# Patient Record
Sex: MALE | Race: WHITE | Employment: UNEMPLOYED | URBAN - NONMETROPOLITAN AREA
[De-identification: names, ages, dates, MRNs, and addresses within clinical notes are randomized per-mention and may not be internally consistent; named-entity substitution may affect disease eponyms.]

---
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
## 2025-04-14 ENCOUNTER — OFFICE VISIT (OUTPATIENT)
Dept: ENT CLINIC | Age: 20
End: 2025-04-14
Payer: COMMERCIAL

## 2025-04-14 VITALS
HEIGHT: 71 IN | BODY MASS INDEX: 25.34 KG/M2 | DIASTOLIC BLOOD PRESSURE: 80 MMHG | WEIGHT: 181 LBS | SYSTOLIC BLOOD PRESSURE: 112 MMHG

## 2025-04-14 DIAGNOSIS — H61.23 BILATERAL IMPACTED CERUMEN: Primary | ICD-10-CM

## 2025-04-14 PROCEDURE — G8419 CALC BMI OUT NRM PARAM NOF/U: HCPCS | Performed by: NURSE PRACTITIONER

## 2025-04-14 PROCEDURE — 69210 REMOVE IMPACTED EAR WAX UNI: CPT | Performed by: NURSE PRACTITIONER

## 2025-04-14 PROCEDURE — 99213 OFFICE O/P EST LOW 20 MIN: CPT | Performed by: NURSE PRACTITIONER

## 2025-04-14 PROCEDURE — 1036F TOBACCO NON-USER: CPT | Performed by: NURSE PRACTITIONER

## 2025-04-14 PROCEDURE — G8427 DOCREV CUR MEDS BY ELIG CLIN: HCPCS | Performed by: NURSE PRACTITIONER

## 2025-04-14 ASSESSMENT — ENCOUNTER SYMPTOMS
ALLERGIC/IMMUNOLOGIC NEGATIVE: 1
RESPIRATORY NEGATIVE: 1
GASTROINTESTINAL NEGATIVE: 1
EYES NEGATIVE: 1

## 2025-04-14 NOTE — PROGRESS NOTES
Effort: Pulmonary effort is normal.   Musculoskeletal:      Cervical back: Normal range of motion.   Skin:     General: Skin is warm and dry.   Neurological:      General: No focal deficit present.      Mental Status: He is alert and oriented to person, place, and time.   Psychiatric:         Mood and Affect: Mood normal.         Behavior: Behavior normal.          Procedure Note:    Use of Operating Microscope and Cerumen Removal CPT code 65400-  Indications: Bilateral cerumen impaction obstructing visualization of the tympanic membrane(s).      An operating microscope was utilized to visualize the external auditory canals using a speculum. The external auditory canals were occluded with cerumen bilateral. The cerumen and debris was removed with instrumentation including suction and currettes under microscopic evaluation. The left tympanic membrane and ossicles are intact. No fluid was visualized in the left middle ear. There is some cerumen remaining in the right canal that could not be removed due to patient discomfort and safety. The right tympanic membrane was partially visualized.            ASSESSMENT/PLAN:    1. Bilateral impacted cerumen  -     carbamide peroxide (DEBROX) 6.5 % otic solution; Place 5 drops into the right ear 2 times daily for 7 days, Disp-15 mL, R-0Normal    Start Debrox drops in right ear. Follow-up in about 1 week for cerumen check, sooner if needed.    Return in about 1 week (around 4/21/2025).    An electronic signature was used to authenticate this note.    CARMEN Aguilar - CNP       Please note that this chart was generated using dragon dictation software.  Although every effort was made to ensure the accuracy of this automated transcription, some errors in transcription may have occurred.

## 2025-04-22 ENCOUNTER — OFFICE VISIT (OUTPATIENT)
Dept: ENT CLINIC | Age: 20
End: 2025-04-22
Payer: COMMERCIAL

## 2025-04-22 VITALS — BODY MASS INDEX: 25.34 KG/M2 | HEIGHT: 71 IN | WEIGHT: 181 LBS

## 2025-04-22 DIAGNOSIS — H61.21 EXCESSIVE CERUMEN IN EAR CANAL, RIGHT: Primary | ICD-10-CM

## 2025-04-22 PROCEDURE — 99213 OFFICE O/P EST LOW 20 MIN: CPT | Performed by: NURSE PRACTITIONER

## 2025-04-22 PROCEDURE — G8427 DOCREV CUR MEDS BY ELIG CLIN: HCPCS | Performed by: NURSE PRACTITIONER

## 2025-04-22 PROCEDURE — 1036F TOBACCO NON-USER: CPT | Performed by: NURSE PRACTITIONER

## 2025-04-22 PROCEDURE — G8419 CALC BMI OUT NRM PARAM NOF/U: HCPCS | Performed by: NURSE PRACTITIONER

## 2025-04-22 ASSESSMENT — ENCOUNTER SYMPTOMS
GASTROINTESTINAL NEGATIVE: 1
RESPIRATORY NEGATIVE: 1
ALLERGIC/IMMUNOLOGIC NEGATIVE: 1
EYES NEGATIVE: 1

## 2025-04-22 NOTE — PROGRESS NOTES
2025    Krunal Vernon (:  2005) is a 20 y.o. male, Established patient, here for evaluation of the following chief complaint(s):  Follow-up (Right ear )      Vitals:    25 1313   Weight: 82.1 kg (181 lb)   Height: 1.803 m (5' 10.98\")       Wt Readings from Last 3 Encounters:   25 82.1 kg (181 lb)   25 82.1 kg (181 lb)   23 72.6 kg (160 lb) (65%, Z= 0.39)*     * Growth percentiles are based on [facility] (Boys, 2-20 Years) data.       BP Readings from Last 3 Encounters:   25 112/80   23 115/67   05/10/23 104/62         SUBJECTIVE/OBJECTIVE:    Patient seen today for follow up of his right ear. He has been using Debrox drops since his last visit. He denies ear pain, fullness, hearing changes, ear drainage, or tinnitus.         Review of Systems   Constitutional: Negative.    HENT: Negative.  Negative for ear discharge, ear pain, hearing loss and tinnitus.    Eyes: Negative.    Respiratory: Negative.     Cardiovascular: Negative.    Gastrointestinal: Negative.    Endocrine: Negative.    Musculoskeletal: Negative.    Skin: Negative.    Allergic/Immunologic: Negative.    Neurological: Negative.    Hematological: Negative.    Psychiatric/Behavioral: Negative.          Physical Exam  Vitals reviewed.   Constitutional:       Appearance: Normal appearance. He is normal weight.   HENT:      Head: Normocephalic and atraumatic.      Right Ear: External ear normal.      Left Ear: Tympanic membrane, ear canal and external ear normal.      Ears:      Comments: Partially occluding cerumen in right ear.      Nose: Nose normal.      Mouth/Throat:      Mouth: Mucous membranes are moist.   Eyes:      Extraocular Movements: Extraocular movements intact.      Pupils: Pupils are equal, round, and reactive to light.   Pulmonary:      Effort: Pulmonary effort is normal.   Musculoskeletal:      Cervical back: Normal range of motion.   Skin:     General: Skin is warm and dry.   Neurological:

## 2025-05-21 ENCOUNTER — TELEPHONE (OUTPATIENT)
Age: 20
End: 2025-05-21

## 2025-05-21 NOTE — TELEPHONE ENCOUNTER
Fx Sheet  Dislocated Lt Shoulder  DOI 5-20  Elia 5-20  Yes X ray 5-20  Pt getting disc  Pt is in a sling

## 2025-05-23 ENCOUNTER — OFFICE VISIT (OUTPATIENT)
Age: 20
End: 2025-05-23
Payer: COMMERCIAL

## 2025-05-23 VITALS — WEIGHT: 184 LBS | HEIGHT: 70 IN | BODY MASS INDEX: 26.34 KG/M2

## 2025-05-23 DIAGNOSIS — S43.005A CLOSED DISLOCATION OF LEFT SHOULDER, INITIAL ENCOUNTER: Primary | ICD-10-CM

## 2025-05-23 PROCEDURE — 1036F TOBACCO NON-USER: CPT | Performed by: PHYSICIAN ASSISTANT

## 2025-05-23 PROCEDURE — G8419 CALC BMI OUT NRM PARAM NOF/U: HCPCS | Performed by: PHYSICIAN ASSISTANT

## 2025-05-23 PROCEDURE — G8427 DOCREV CUR MEDS BY ELIG CLIN: HCPCS | Performed by: PHYSICIAN ASSISTANT

## 2025-05-23 PROCEDURE — 99203 OFFICE O/P NEW LOW 30 MIN: CPT | Performed by: PHYSICIAN ASSISTANT

## 2025-05-23 NOTE — PROGRESS NOTES
Orthopaedic Clinic Note-New Patient    NAME:  Krunal Vernon   : 2005  MRN: [id number]      2025     CHIEF COMPLAINT:  Left shoulder pain/dislocation      HISTORY OF PRESENT ILLNESS:   Krunal is a 20 y.o. male who presents to the office for evaluation and treatment of the left shoulder.   Pain began on 2025.  The patient was skateboarding when he fell and hit the left shoulder.  He proceeded to [facility] where he was told he dislocated his left shoulder.  It was reduced.  He is currently in a sling.    Past Medical History:        Diagnosis Date    Allergic rhinitis due to pollen     Eczema     Head ache     Migraine without status migrainosus, not intractable, unspecified migraine type     Non-seasonal allergic rhinitis     Other headache syndrome        Past Surgical History:        Procedure Laterality Date    TONSILLECTOMY AND ADENOIDECTOMY      TYMPANOSTOMY TUBE PLACEMENT      x 8 sets       Current Medications:   Prior to Admission medications    Medication Sig Start Date End Date Taking? Authorizing Provider   EPINEPHrine (SYMJEPI) 0.3 MG/0.3ML SOSY injection Inject 0.3 mLs into the muscle as needed for Anaphylaxis   Yes Provider, MD Carlo   Atogepant (QULIPTA) 60 MG TABS Take 60 mg by mouth daily  Patient not taking: Reported on 2025   Joleen Huynh APRN   rizatriptan (MAXALT) 5 MG tablet Take 1 tab po daily prn, may repeat in 2 hours if needed, max 2 tabs / 24 hours.  Patient not taking: Reported on 2025   Joleen Huynh APRN   vitamin D (ERGOCALCIFEROL) 1.25 MG (38672 UT) CAPS capsule Take 1 capsule by mouth once a week  Patient not taking: Reported on 2025 11/10/22   Provider, MD Carlo   hydrOXYzine (ATARAX) 10 MG tablet Take 1 tablet by mouth 2 times daily  Patient not taking: Reported on 2025   Jenna Sorto APRN - CNP       Allergies:  Cefdinir    Social History:   Social History     Occupational History    Not

## 2025-06-02 ENCOUNTER — HOSPITAL ENCOUNTER (OUTPATIENT)
Dept: MRI IMAGING | Age: 20
Discharge: HOME | End: 2025-06-02
Payer: COMMERCIAL

## 2025-06-02 DIAGNOSIS — S43.005A CLOSED DISLOCATION OF LEFT SHOULDER, INITIAL ENCOUNTER: ICD-10-CM

## 2025-06-02 PROCEDURE — 73221 MRI JOINT UPR EXTREM W/O DYE: CPT

## 2025-06-03 ENCOUNTER — TELEPHONE (OUTPATIENT)
Age: 20
End: 2025-06-03

## 2025-06-13 ENCOUNTER — OFFICE VISIT (OUTPATIENT)
Age: 20
End: 2025-06-13
Payer: COMMERCIAL

## 2025-06-13 ENCOUNTER — TELEPHONE (OUTPATIENT)
Age: 20
End: 2025-06-13

## 2025-06-13 VITALS — HEIGHT: 70 IN | WEIGHT: 184 LBS | BODY MASS INDEX: 26.34 KG/M2

## 2025-06-13 DIAGNOSIS — S43.005D CLOSED DISLOCATION OF LEFT SHOULDER, SUBSEQUENT ENCOUNTER: Primary | ICD-10-CM

## 2025-06-13 PROCEDURE — G8427 DOCREV CUR MEDS BY ELIG CLIN: HCPCS | Performed by: PHYSICIAN ASSISTANT

## 2025-06-13 PROCEDURE — 4004F PT TOBACCO SCREEN RCVD TLK: CPT | Performed by: PHYSICIAN ASSISTANT

## 2025-06-13 PROCEDURE — G8419 CALC BMI OUT NRM PARAM NOF/U: HCPCS | Performed by: PHYSICIAN ASSISTANT

## 2025-06-13 PROCEDURE — 99213 OFFICE O/P EST LOW 20 MIN: CPT | Performed by: PHYSICIAN ASSISTANT

## 2025-06-13 NOTE — TELEPHONE ENCOUNTER
Pt is needing a work excuse explaining why he is out of work and for how long he will be out of work, please call pt back at [phone]

## 2025-06-18 ENCOUNTER — TELEPHONE (OUTPATIENT)
Age: 20
End: 2025-06-18

## 2025-06-20 ENCOUNTER — TELEPHONE (OUTPATIENT)
Age: 20
End: 2025-06-20

## 2025-06-20 NOTE — TELEPHONE ENCOUNTER
Called pt back to let him know that there is a 14 day time window for it to be filled out by the clinic and that it will be filled at out as soon as possible. Pt verbalized understanding

## 2025-06-30 ENCOUNTER — TELEPHONE (OUTPATIENT)
Age: 20
End: 2025-06-30

## 2025-06-30 NOTE — TELEPHONE ENCOUNTER
FMLA/STD papers received. No payment needed due to continuation of forms. Forms given to clinic staff for completion.

## 2025-07-29 ENCOUNTER — OFFICE VISIT (OUTPATIENT)
Age: 20
End: 2025-07-29
Payer: COMMERCIAL

## 2025-07-29 VITALS — WEIGHT: 179.6 LBS | HEIGHT: 70 IN | BODY MASS INDEX: 25.71 KG/M2

## 2025-07-29 DIAGNOSIS — S43.005D CLOSED DISLOCATION OF LEFT SHOULDER, SUBSEQUENT ENCOUNTER: Primary | ICD-10-CM

## 2025-07-29 PROCEDURE — 1036F TOBACCO NON-USER: CPT | Performed by: PHYSICIAN ASSISTANT

## 2025-07-29 PROCEDURE — 99213 OFFICE O/P EST LOW 20 MIN: CPT | Performed by: PHYSICIAN ASSISTANT

## 2025-07-29 PROCEDURE — G8419 CALC BMI OUT NRM PARAM NOF/U: HCPCS | Performed by: PHYSICIAN ASSISTANT

## 2025-07-29 PROCEDURE — G8427 DOCREV CUR MEDS BY ELIG CLIN: HCPCS | Performed by: PHYSICIAN ASSISTANT

## 2025-07-29 RX ORDER — ONDANSETRON 4 MG/1
TABLET, ORALLY DISINTEGRATING ORAL
COMMUNITY

## 2025-07-29 NOTE — PROGRESS NOTES
[facility]  [facility]  [address]  [facility] [address] [phone]     Orthopaedic Clinic Note-Established Patient    NAME:  Krunal Vernon   : 2005  MRN: [id number]      2025     CHIEF COMPLAINT:  Left shoulder pain/dislocation      HISTORY OF PRESENT ILLNESS:   Krunal is a 20 y.o. male who presents to the office for evaluation and treatment of the left shoulder.  As a review, pain began on 2025 when he was skateboarding when he fell, hitting the left shoulder.  He proceeded to [facility] where he was told he dislocated his left shoulder.  It was reduced.  He is currently in a sling.  An MRI confirmed a capsular stripping/periosteal sleeve injury that is nondisplaced.     He has been undergoing PT and is here today for follow-up.  He notes the PT is helping.  He does not feel the sensation of popping out of place any longer.  He does still have painful range of motion and notes weakness in the upper extremity    Past Medical History:        Diagnosis Date    Allergic rhinitis due to pollen     Eczema     Head ache     Migraine without status migrainosus, not intractable, unspecified migraine type     Non-seasonal allergic rhinitis     Other headache syndrome        Past Surgical History:        Procedure Laterality Date    TONSILLECTOMY AND ADENOIDECTOMY      TYMPANOSTOMY TUBE PLACEMENT      x 8 sets       Current Medications:   Prior to Admission medications    Medication Sig Start Date End Date Taking? Authorizing Provider   ondansetron (ZOFRAN-ODT) 4 MG disintegrating tablet DISSOLVE ONE TABLET BY MOUTH TWICE DAILY   Yes Provider, MD Carlo   Atogepant (QULIPTA) 60 MG TABS Take 60 mg by mouth daily 23  Yes Joleen Huynh APRN   rizatriptan (MAXALT) 5 MG tablet Take 1 tab po daily prn, may repeat in 2 hours if needed, max 2 tabs / 24 hours. 23  Yes Joleen Huynh APRN   vitamin D

## 2025-08-06 ENCOUNTER — TELEPHONE (OUTPATIENT)
Age: 20
End: 2025-08-06

## 2025-09-02 ENCOUNTER — OFFICE VISIT (OUTPATIENT)
Age: 20
End: 2025-09-02
Payer: COMMERCIAL

## 2025-09-02 VITALS — BODY MASS INDEX: 26.05 KG/M2 | HEIGHT: 70 IN | WEIGHT: 182 LBS

## 2025-09-02 DIAGNOSIS — S43.005D CLOSED DISLOCATION OF LEFT SHOULDER, SUBSEQUENT ENCOUNTER: Primary | ICD-10-CM

## 2025-09-02 PROCEDURE — 99213 OFFICE O/P EST LOW 20 MIN: CPT | Performed by: PHYSICIAN ASSISTANT

## 2025-09-02 PROCEDURE — G8419 CALC BMI OUT NRM PARAM NOF/U: HCPCS | Performed by: PHYSICIAN ASSISTANT

## 2025-09-02 PROCEDURE — 1036F TOBACCO NON-USER: CPT | Performed by: PHYSICIAN ASSISTANT

## 2025-09-02 PROCEDURE — G8427 DOCREV CUR MEDS BY ELIG CLIN: HCPCS | Performed by: PHYSICIAN ASSISTANT

## 2025-09-04 ENCOUNTER — OFFICE VISIT (OUTPATIENT)
Dept: ENT CLINIC | Age: 20
End: 2025-09-04
Payer: COMMERCIAL

## 2025-09-04 VITALS
WEIGHT: 182 LBS | DIASTOLIC BLOOD PRESSURE: 80 MMHG | BODY MASS INDEX: 26.05 KG/M2 | HEIGHT: 70 IN | SYSTOLIC BLOOD PRESSURE: 120 MMHG

## 2025-09-04 DIAGNOSIS — H92.11 OTORRHEA, RIGHT: ICD-10-CM

## 2025-09-04 DIAGNOSIS — H66.91 ACUTE OTITIS MEDIA, RIGHT: Primary | ICD-10-CM

## 2025-09-04 PROCEDURE — G8427 DOCREV CUR MEDS BY ELIG CLIN: HCPCS | Performed by: NURSE PRACTITIONER

## 2025-09-04 PROCEDURE — 92504 EAR MICROSCOPY EXAMINATION: CPT | Performed by: NURSE PRACTITIONER

## 2025-09-04 PROCEDURE — 99213 OFFICE O/P EST LOW 20 MIN: CPT | Performed by: NURSE PRACTITIONER

## 2025-09-04 PROCEDURE — 1036F TOBACCO NON-USER: CPT | Performed by: NURSE PRACTITIONER

## 2025-09-04 PROCEDURE — G8419 CALC BMI OUT NRM PARAM NOF/U: HCPCS | Performed by: NURSE PRACTITIONER

## 2025-09-04 RX ORDER — PREDNISONE 20 MG/1
TABLET ORAL
Qty: 21 TABLET | Refills: 0 | Status: SHIPPED | OUTPATIENT
Start: 2025-09-04

## 2025-09-04 RX ORDER — CIPROFLOXACIN AND DEXAMETHASONE 3; 1 MG/ML; MG/ML
4 SUSPENSION/ DROPS AURICULAR (OTIC) 2 TIMES DAILY
Qty: 7.5 ML | Refills: 0 | Status: SHIPPED | OUTPATIENT
Start: 2025-09-04 | End: 2025-09-14

## 2025-09-04 RX ORDER — CLINDAMYCIN HYDROCHLORIDE 300 MG/1
300 CAPSULE ORAL 3 TIMES DAILY
Qty: 30 CAPSULE | Refills: 0 | Status: SHIPPED | OUTPATIENT
Start: 2025-09-04 | End: 2025-09-14

## 2025-09-04 ASSESSMENT — ENCOUNTER SYMPTOMS
EYES NEGATIVE: 1
GASTROINTESTINAL NEGATIVE: 1
RESPIRATORY NEGATIVE: 1
ALLERGIC/IMMUNOLOGIC NEGATIVE: 1